# Patient Record
Sex: FEMALE | Race: BLACK OR AFRICAN AMERICAN | NOT HISPANIC OR LATINO | Employment: OTHER | URBAN - METROPOLITAN AREA
[De-identification: names, ages, dates, MRNs, and addresses within clinical notes are randomized per-mention and may not be internally consistent; named-entity substitution may affect disease eponyms.]

---

## 2018-11-20 PROBLEM — H93.8X3 SENSATION OF FULLNESS IN BOTH EARS: Status: ACTIVE | Noted: 2018-11-20

## 2018-11-20 PROBLEM — R53.83 FATIGUE: Status: ACTIVE | Noted: 2018-11-20

## 2018-11-20 PROBLEM — R42 DIZZINESS: Status: ACTIVE | Noted: 2018-11-20

## 2018-11-20 PROBLEM — I10 ESSENTIAL HYPERTENSION: Status: ACTIVE | Noted: 2018-11-20

## 2021-03-05 ENCOUNTER — IMMUNIZATION (OUTPATIENT)
Dept: INTERNAL MEDICINE | Facility: CLINIC | Age: 76
End: 2021-03-05
Payer: MEDICARE

## 2021-03-05 DIAGNOSIS — Z23 NEED FOR VACCINATION: Primary | ICD-10-CM

## 2021-03-05 PROCEDURE — 91300 COVID-19, MRNA, LNP-S, PF, 30 MCG/0.3 ML DOSE VACCINE: CPT | Mod: PBBFAC | Performed by: FAMILY MEDICINE

## 2021-03-26 ENCOUNTER — IMMUNIZATION (OUTPATIENT)
Dept: INTERNAL MEDICINE | Facility: CLINIC | Age: 76
End: 2021-03-26
Payer: MEDICARE

## 2021-03-26 DIAGNOSIS — Z23 NEED FOR VACCINATION: Primary | ICD-10-CM

## 2021-03-26 PROCEDURE — 91300 COVID-19, MRNA, LNP-S, PF, 30 MCG/0.3 ML DOSE VACCINE: CPT | Mod: PBBFAC | Performed by: FAMILY MEDICINE

## 2021-03-26 PROCEDURE — 0002A COVID-19, MRNA, LNP-S, PF, 30 MCG/0.3 ML DOSE VACCINE: CPT | Mod: PBBFAC | Performed by: FAMILY MEDICINE

## 2023-06-13 ENCOUNTER — TELEPHONE (OUTPATIENT)
Dept: NEPHROLOGY | Facility: CLINIC | Age: 78
End: 2023-06-13
Payer: MEDICARE

## 2023-06-13 DIAGNOSIS — I10 ESSENTIAL HYPERTENSION: Primary | ICD-10-CM

## 2023-06-13 NOTE — TELEPHONE ENCOUNTER
----- Message from Oralia Rios sent at 6/13/2023  3:23 PM CDT -----  Contact: pt/[phone]  Type:  Sooner Apoointment Request    Caller is requesting a sooner appointment.    Name of Caller:Pt    When is the first available appointment?\09/11/2023  Symptoms: pt  states  her  labs  are  abnormal    Would the patient rather a call back or a response via [facility]? Call   Best Call Back Number:[phone]  Additional Information:  pt  does preferred to  see  Dr. Castillo  at  the  [facility]

## 2023-06-13 NOTE — TELEPHONE ENCOUNTER
"Gave first avail appt and labs. Gave number for renal [facility]oc also to see if she can get in sooner she will cancel this.". 6/13/23/sf   "

## 2023-09-09 ENCOUNTER — LAB VISIT (OUTPATIENT)
Dept: LAB | Facility: HOSPITAL | Age: 78
End: 2023-09-09
Attending: INTERNAL MEDICINE
Payer: MEDICARE

## 2023-09-09 DIAGNOSIS — I10 ESSENTIAL HYPERTENSION: ICD-10-CM

## 2023-09-09 LAB
ANION GAP SERPL CALC-SCNC: 10 MMOL/L (ref 8–16)
BASOPHILS # BLD AUTO: 0.05 K/UL (ref 0–0.2)
BASOPHILS NFR BLD: 1 % (ref 0–1.9)
BUN SERPL-MCNC: 19 MG/DL (ref 8–23)
CALCIUM SERPL-MCNC: 9 MG/DL (ref 8.7–10.5)
CHLORIDE SERPL-SCNC: 108 MMOL/L (ref 95–110)
CO2 SERPL-SCNC: 24 MMOL/L (ref 23–29)
CREAT SERPL-MCNC: 1 MG/DL (ref 0.5–1.4)
DIFFERENTIAL METHOD: NORMAL
EOSINOPHIL # BLD AUTO: 0.1 K/UL (ref 0–0.5)
EOSINOPHIL NFR BLD: 1.6 % (ref 0–8)
ERYTHROCYTE [DISTWIDTH] IN BLOOD BY AUTOMATED COUNT: 14.5 % (ref 11.5–14.5)
EST. GFR  (NO RACE VARIABLE): 58 ML/MIN/1.73 M^2
GLUCOSE SERPL-MCNC: 84 MG/DL (ref 70–110)
HCT VFR BLD AUTO: 39 % (ref 37–48.5)
HGB BLD-MCNC: 12.7 G/DL (ref 12–16)
IMM GRANULOCYTES # BLD AUTO: 0.02 K/UL (ref 0–0.04)
IMM GRANULOCYTES NFR BLD AUTO: 0.4 % (ref 0–0.5)
LYMPHOCYTES # BLD AUTO: 1 K/UL (ref 1–4.8)
LYMPHOCYTES NFR BLD: 20.8 % (ref 18–48)
MCH RBC QN AUTO: 29.5 PG (ref 27–31)
MCHC RBC AUTO-ENTMCNC: 32.6 G/DL (ref 32–36)
MCV RBC AUTO: 91 FL (ref 82–98)
MONOCYTES # BLD AUTO: 0.5 K/UL (ref 0.3–1)
MONOCYTES NFR BLD: 9.6 % (ref 4–15)
NEUTROPHILS # BLD AUTO: 3.3 K/UL (ref 1.8–7.7)
NEUTROPHILS NFR BLD: 66.6 % (ref 38–73)
NRBC BLD-RTO: 0 /100 WBC
PLATELET # BLD AUTO: 214 K/UL (ref 150–450)
PMV BLD AUTO: 11.2 FL (ref 9.2–12.9)
POTASSIUM SERPL-SCNC: 3.7 MMOL/L (ref 3.5–5.1)
RBC # BLD AUTO: 4.31 M/UL (ref 4–5.4)
SODIUM SERPL-SCNC: 142 MMOL/L (ref 136–145)
WBC # BLD AUTO: 4.9 K/UL (ref 3.9–12.7)

## 2023-09-09 PROCEDURE — 80048 BASIC METABOLIC PNL TOTAL CA: CPT | Performed by: INTERNAL MEDICINE

## 2023-09-09 PROCEDURE — 36415 COLL VENOUS BLD VENIPUNCTURE: CPT | Performed by: INTERNAL MEDICINE

## 2023-09-09 PROCEDURE — 85025 COMPLETE CBC W/AUTO DIFF WBC: CPT | Performed by: INTERNAL MEDICINE

## 2023-09-11 ENCOUNTER — OFFICE VISIT (OUTPATIENT)
Dept: NEPHROLOGY | Facility: CLINIC | Age: 78
End: 2023-09-11
Payer: MEDICARE

## 2023-09-11 ENCOUNTER — TELEPHONE (OUTPATIENT)
Dept: NEPHROLOGY | Facility: CLINIC | Age: 78
End: 2023-09-11
Payer: MEDICARE

## 2023-09-11 VITALS
DIASTOLIC BLOOD PRESSURE: 66 MMHG | HEIGHT: 63 IN | RESPIRATION RATE: 18 BRPM | SYSTOLIC BLOOD PRESSURE: 130 MMHG | BODY MASS INDEX: 25.08 KG/M2 | WEIGHT: 141.56 LBS | HEART RATE: 82 BPM

## 2023-09-11 DIAGNOSIS — R94.4 DECREASED GFR: Primary | ICD-10-CM

## 2023-09-11 PROCEDURE — 3288F PR FALLS RISK ASSESSMENT DOCUMENTED: ICD-10-PCS | Mod: CPTII,S$GLB,, | Performed by: INTERNAL MEDICINE

## 2023-09-11 PROCEDURE — 99999 PR PBB SHADOW E&M-EST. PATIENT-LVL III: ICD-10-PCS | Mod: PBBFAC,,, | Performed by: INTERNAL MEDICINE

## 2023-09-11 PROCEDURE — 1101F PR PT FALLS ASSESS DOC 0-1 FALLS W/OUT INJ PAST YR: ICD-10-PCS | Mod: CPTII,S$GLB,, | Performed by: INTERNAL MEDICINE

## 2023-09-11 PROCEDURE — 3078F PR MOST RECENT DIASTOLIC BLOOD PRESSURE < 80 MM HG: ICD-10-PCS | Mod: CPTII,S$GLB,, | Performed by: INTERNAL MEDICINE

## 2023-09-11 PROCEDURE — 99204 OFFICE O/P NEW MOD 45 MIN: CPT | Mod: S$GLB,,, | Performed by: INTERNAL MEDICINE

## 2023-09-11 PROCEDURE — 3078F DIAST BP <80 MM HG: CPT | Mod: CPTII,S$GLB,, | Performed by: INTERNAL MEDICINE

## 2023-09-11 PROCEDURE — 1159F PR MEDICATION LIST DOCUMENTED IN MEDICAL RECORD: ICD-10-PCS | Mod: CPTII,S$GLB,, | Performed by: INTERNAL MEDICINE

## 2023-09-11 PROCEDURE — 3075F SYST BP GE 130 - 139MM HG: CPT | Mod: CPTII,S$GLB,, | Performed by: INTERNAL MEDICINE

## 2023-09-11 PROCEDURE — 1126F AMNT PAIN NOTED NONE PRSNT: CPT | Mod: CPTII,S$GLB,, | Performed by: INTERNAL MEDICINE

## 2023-09-11 PROCEDURE — 3288F FALL RISK ASSESSMENT DOCD: CPT | Mod: CPTII,S$GLB,, | Performed by: INTERNAL MEDICINE

## 2023-09-11 PROCEDURE — 99999 PR PBB SHADOW E&M-EST. PATIENT-LVL III: CPT | Mod: PBBFAC,,, | Performed by: INTERNAL MEDICINE

## 2023-09-11 PROCEDURE — 1159F MED LIST DOCD IN RCRD: CPT | Mod: CPTII,S$GLB,, | Performed by: INTERNAL MEDICINE

## 2023-09-11 PROCEDURE — 1126F PR PAIN SEVERITY QUANTIFIED, NO PAIN PRESENT: ICD-10-PCS | Mod: CPTII,S$GLB,, | Performed by: INTERNAL MEDICINE

## 2023-09-11 PROCEDURE — 1101F PT FALLS ASSESS-DOCD LE1/YR: CPT | Mod: CPTII,S$GLB,, | Performed by: INTERNAL MEDICINE

## 2023-09-11 PROCEDURE — 99204 PR OFFICE/OUTPT VISIT, NEW, LEVL IV, 45-59 MIN: ICD-10-PCS | Mod: S$GLB,,, | Performed by: INTERNAL MEDICINE

## 2023-09-11 PROCEDURE — 3075F PR MOST RECENT SYSTOLIC BLOOD PRESS GE 130-139MM HG: ICD-10-PCS | Mod: CPTII,S$GLB,, | Performed by: INTERNAL MEDICINE

## 2023-09-11 RX ORDER — NEBIVOLOL 2.5 MG/1
2.5 TABLET ORAL
COMMUNITY

## 2023-09-11 NOTE — TELEPHONE ENCOUNTER
----- Message from Saadia Kaiser sent at 9/11/2023 11:15 AM CDT -----  Contact: nicole Jimenez is calling to speak with a nurse regarding appt . Please give a call back at [phone] .

## 2023-09-11 NOTE — PROGRESS NOTES
"Heather Lewis is a 78 y.o. female for whom nephrology consult has been requested to evaluate and give opinion.   Renal clinic consult note:  Date of consult: 9/11/23  Reason for consult: reduced eGFR  Referring physician: dr. Rj Mcnamara    HPI: Thank you for referring the pt to us. H/o and chart were reviewed. Pt was seen and examined. Pt is a 77 y/o female who presents because "a lab report in her PCP office stated that she had reduced kidney function." Pt has no acute or new c/o's, no prior issues, no dehydration, no GI losses, no NSAds being taken, no abx, no recent infections, no dysuria, no cardiopulmonary sx's. No c/o's in office today. Cr is 1.0 mg/dl.      PAST MEDICAL HISTORY:  CKD stage 3, HTN (hypertension).    PAST SURGICAL HISTORY:  She  has no past surgical history on file.    SOCIAL HISTORY:  She  reports that she has never smoked. She does not have any smokeless tobacco history on file.    FAMILY MEDICAL HISTORY:  Her family history is not on file.    Review of patient's allergies indicates:   Allergen Reactions    Benzalkonium chloride     Cephalexin     Latex, natural rubber     Neosporin (neomycin-polymyx)     Penicillins            Prior to Admission medications    Medication Sig Start Date End Date Taking? Authorizing Provider   aspirin (ECOTRIN) 81 MG EC tablet Take 81 mg by mouth.   Yes Provider, Historical   fenofibrate 120 mg Tab Take 1 tablet by mouth once daily. with food. 8/23/18  Yes Provider, Historical   latanoprost 0.005 % ophthalmic solution  10/30/18  Yes Provider, Historical   meclizine (ANTIVERT) 12.5 mg tablet **NOT COVERED** TAKE 1 TABLET BY MOUTH TWICE A DAY AS NEEDED FOR DIZZINESS 10/9/18  Yes Provider, Historical   nebivoloL (BYSTOLIC) 2.5 MG Tab Take 2.5 mg by mouth.   Yes Provider, Historical   SIMBRINZA 1-0.2 % DrpS INSTILL 1 DROP INTO LEFT EYE TWICE A DAY 9/24/18  Yes Provider, Historical   VITAMIN D2 50,000 unit capsule Take 50,000 Units by mouth every 30 days. " "10/22/18  Yes Provider, Historical   lisinopril (PRINIVIL,ZESTRIL) 5 MG tablet Take 5 mg by mouth once daily. 11/1/18 9/11/23  Provider, Historical        REVIEW OF SYSTEMS:  Patient has no fever, fatigue, visual changes, chest pain, edema, cough, dyspnea, nausea, vomiting, constipation, diarrhea, arthralgias, pruritis, dizziness, weakness, depression, confusion.      PHYSICAL EXAM:   height is 5' 3" (1.6 m) and weight is 64.2 kg (141 lb 8.6 oz). Her blood pressure is 130/66 and her pulse is 82. Her respiration is 18.   Gen: WDWN female in no apparent distress  Psych: Normal mood and affect  Skin: No rashes or ulcers  Neck: No JVD  Chest: Clear with no rales, rhonchi, wheezing with normal effort  CV: Regular with no murmurs, gallops or rubs  Abd: Soft, nontender, no distension  Ext: No edema    Labs reviewed  BMP  Lab Results   Component Value Date     09/09/2023    K 3.7 09/09/2023     09/09/2023    CO2 24 09/09/2023    BUN 19 09/09/2023    CREATININE 1.0 09/09/2023    CALCIUM 9.0 09/09/2023    ANIONGAP 10 09/09/2023    EGFRNORACEVR 58.0 (A) 09/09/2023     Lab Results   Component Value Date    WBC 4.90 09/09/2023    HGB 12.7 09/09/2023    HCT 39.0 09/09/2023    MCV 91 09/09/2023     09/09/2023       IMPRESSION AND RECOMMENDATIONS: 77 y/o female with reported reduced eGFr presented for evaluation:    Renal: s Cr is within the normal range  Reduced eGFR is due to age related decline in kidney fucntion  K normal  Na normal  Ca normal  Acid base no issues  No u/s to comment on, will order.    Pt was reassured.    2. 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    Plans and recommendations:  As reviewed above  Total time spent 45 minutes including time needed to review the records, the   patient evaluation, documentation, face-to-face discussion with the patient,   more than 50% of the time was spent on " coordination of care and counseling.    Level IV visit.  RTC 1 year  Consult note was sent to the referring MD.

## 2024-07-25 ENCOUNTER — TELEPHONE (OUTPATIENT)
Dept: NEPHROLOGY | Facility: CLINIC | Age: 79
End: 2024-07-25
Payer: MEDICARE

## 2024-07-25 ENCOUNTER — TELEPHONE (OUTPATIENT)
Dept: PULMONOLOGY | Facility: CLINIC | Age: 79
End: 2024-07-25
Payer: MEDICARE

## 2024-07-25 DIAGNOSIS — R94.4 DECREASED GFR: Primary | ICD-10-CM

## 2024-07-25 NOTE — TELEPHONE ENCOUNTER
----- Message from Niko Longoria sent at 7/25/2024  2:12 PM CDT -----  Contact: Heather  Type:  Sooner Apoointment Request    Caller is requesting a sooner appointment.  Caller declined first available appointment listed below.  Caller will not accept being placed on the waitlist and is requesting a message be sent to doctor.  Name of Caller:Heather   When is the first available appointment?9/13  Symptoms:Concerned about kidneys and kidney function  Would the patient rather a call back or a response via [facility]? call  Best Call Back Number:[phone]   Additional Information:

## 2024-07-25 NOTE — TELEPHONE ENCOUNTER
----- Message from Diamone Speed sent at 7/25/2024  3:48 PM CDT -----  Regarding: self  Type: Patient Call Back       Who called: self        What is the request in detail: pt would liek a call bavck       Can the clinic reply by [facility]? Yes       Would the patient rather a call back or a response via [facility]? Call back       Best call back number:[phone]

## 2024-07-26 ENCOUNTER — LAB VISIT (OUTPATIENT)
Dept: LAB | Facility: HOSPITAL | Age: 79
End: 2024-07-26
Attending: INTERNAL MEDICINE
Payer: MEDICARE

## 2024-07-26 DIAGNOSIS — R94.4 DECREASED GFR: ICD-10-CM

## 2024-07-26 LAB
BILIRUB UR QL STRIP: NEGATIVE
CLARITY UR: CLEAR
COLOR UR: YELLOW
CREAT UR-MCNC: 153 MG/DL (ref 15–325)
GLUCOSE UR QL STRIP: NEGATIVE
HGB UR QL STRIP: NEGATIVE
KETONES UR QL STRIP: NEGATIVE
LEUKOCYTE ESTERASE UR QL STRIP: NEGATIVE
NITRITE UR QL STRIP: NEGATIVE
PH UR STRIP: 6 [PH] (ref 5–8)
PROT UR QL STRIP: NEGATIVE
PROT UR-MCNC: <7 MG/DL (ref 0–15)
PROT/CREAT UR: NORMAL MG/G{CREAT} (ref 0–0.2)
SP GR UR STRIP: 1.02 (ref 1–1.03)
URN SPEC COLLECT METH UR: NORMAL

## 2024-07-26 PROCEDURE — 82570 ASSAY OF URINE CREATININE: CPT | Performed by: INTERNAL MEDICINE

## 2024-07-26 PROCEDURE — 81003 URINALYSIS AUTO W/O SCOPE: CPT | Performed by: INTERNAL MEDICINE

## 2024-07-31 ENCOUNTER — OFFICE VISIT (OUTPATIENT)
Dept: NEPHROLOGY | Facility: CLINIC | Age: 79
End: 2024-07-31
Payer: MEDICARE

## 2024-07-31 VITALS
DIASTOLIC BLOOD PRESSURE: 62 MMHG | HEIGHT: 63 IN | SYSTOLIC BLOOD PRESSURE: 110 MMHG | BODY MASS INDEX: 24.61 KG/M2 | WEIGHT: 138.88 LBS | HEART RATE: 77 BPM

## 2024-07-31 DIAGNOSIS — I10 PRIMARY HYPERTENSION: ICD-10-CM

## 2024-07-31 DIAGNOSIS — N18.31 STAGE 3A CHRONIC KIDNEY DISEASE: Primary | ICD-10-CM

## 2024-07-31 PROCEDURE — 3074F SYST BP LT 130 MM HG: CPT | Mod: CPTII,S$GLB,, | Performed by: INTERNAL MEDICINE

## 2024-07-31 PROCEDURE — 3288F FALL RISK ASSESSMENT DOCD: CPT | Mod: CPTII,S$GLB,, | Performed by: INTERNAL MEDICINE

## 2024-07-31 PROCEDURE — 3078F DIAST BP <80 MM HG: CPT | Mod: CPTII,S$GLB,, | Performed by: INTERNAL MEDICINE

## 2024-07-31 PROCEDURE — 99215 OFFICE O/P EST HI 40 MIN: CPT | Mod: S$GLB,,, | Performed by: INTERNAL MEDICINE

## 2024-07-31 PROCEDURE — 99999 PR PBB SHADOW E&M-EST. PATIENT-LVL IV: CPT | Mod: PBBFAC,,, | Performed by: INTERNAL MEDICINE

## 2024-07-31 PROCEDURE — 1126F AMNT PAIN NOTED NONE PRSNT: CPT | Mod: CPTII,S$GLB,, | Performed by: INTERNAL MEDICINE

## 2024-07-31 PROCEDURE — 1159F MED LIST DOCD IN RCRD: CPT | Mod: CPTII,S$GLB,, | Performed by: INTERNAL MEDICINE

## 2024-07-31 PROCEDURE — 1101F PT FALLS ASSESS-DOCD LE1/YR: CPT | Mod: CPTII,S$GLB,, | Performed by: INTERNAL MEDICINE

## 2024-07-31 PROCEDURE — 1160F RVW MEDS BY RX/DR IN RCRD: CPT | Mod: CPTII,S$GLB,, | Performed by: INTERNAL MEDICINE

## 2024-07-31 RX ORDER — LOSARTAN POTASSIUM 50 MG/1
50 TABLET ORAL DAILY
Qty: 90 TABLET | Refills: 3 | Status: SHIPPED | OUTPATIENT
Start: 2024-07-31 | End: 2025-07-31

## 2024-07-31 NOTE — PROGRESS NOTES
"Subjective:       Patient ID: Heather Lewis is a 79 y.o. female.    Chief Complaint: Decreased GFR    HPI    She presents to clinic today with her daughter for routine follow-up.  Since her last office visit she has been doing well.  Her primary concern is that her blood pressure has been showing some lability over the past few weeks to months.  She brings a blood pressure diary to clinic today that shows often times her blood pressures are higher in the evenings.  Fortunately she is not having severe spikes however her systolic blood pressures often times increase into the 140s 50s.  Her laboratory studies and medications were reviewed.  All Nephrology related questions were answered to her satisfaction.    Review of Systems   Constitutional: Negative.    HENT: Negative.     Respiratory: Negative.     Cardiovascular: Negative.    Gastrointestinal: Negative.    Genitourinary: Negative.    Musculoskeletal: Negative.    Skin: Negative.        /62   Pulse 77   Ht 5' 3" (1.6 m)   Wt 63 kg (138 lb 14.2 oz)   BMI 24.60 kg/m²     Lab Results   Component Value Date    WBC 4.90 09/09/2023    HGB 12.7 09/09/2023    HCT 39.0 09/09/2023    MCV 91 09/09/2023     09/09/2023      BMP  Lab Results   Component Value Date     09/09/2023    K 3.7 09/09/2023     09/09/2023    CO2 24 09/09/2023    BUN 19 09/09/2023    CREATININE 1.0 09/09/2023    CALCIUM 9.0 09/09/2023    ANIONGAP 10 09/09/2023     CMP  Sodium   Date Value Ref Range Status   09/09/2023 142 136 - 145 mmol/L Final     Potassium   Date Value Ref Range Status   09/09/2023 3.7 3.5 - 5.1 mmol/L Final     Chloride   Date Value Ref Range Status   09/09/2023 108 95 - 110 mmol/L Final     CO2   Date Value Ref Range Status   09/09/2023 24 23 - 29 mmol/L Final     Glucose   Date Value Ref Range Status   09/09/2023 84 70 - 110 mg/dL Final     BUN   Date Value Ref Range Status   09/09/2023 19 8 - 23 mg/dL Final     Creatinine   Date Value Ref Range " Status   09/09/2023 1.0 0.5 - 1.4 mg/dL Final     Calcium   Date Value Ref Range Status   09/09/2023 9.0 8.7 - 10.5 mg/dL Final     Anion Gap   Date Value Ref Range Status   09/09/2023 10 8 - 16 mmol/L Final     Current Outpatient Medications on File Prior to Visit   Medication Sig Dispense Refill    aspirin (ECOTRIN) 81 MG EC tablet Take 81 mg by mouth.      fenofibrate 120 mg Tab Take 1 tablet by mouth once daily. with food.  5    latanoprost 0.005 % ophthalmic solution       meclizine (ANTIVERT) 12.5 mg tablet **NOT COVERED** TAKE 1 TABLET BY MOUTH TWICE A DAY AS NEEDED FOR DIZZINESS  0    nebivoloL (BYSTOLIC) 2.5 MG Tab Take 2.5 mg by mouth.      SIMBRINZA 1-0.2 % DrpS INSTILL 1 DROP INTO LEFT EYE TWICE A DAY  99    VITAMIN D2 50,000 unit capsule Take 50,000 Units by mouth every 30 days.  0     No current facility-administered medications on file prior to visit.            Objective:            Physical Exam  Constitutional:       Appearance: Normal appearance.   HENT:      Head: Normocephalic and atraumatic.   Eyes:      General: No scleral icterus.     Extraocular Movements: Extraocular movements intact.      Pupils: Pupils are equal, round, and reactive to light.   Pulmonary:      Effort: Pulmonary effort is normal.      Breath sounds: No stridor.   Musculoskeletal:      Right lower leg: No edema.      Left lower leg: No edema.   Skin:     General: Skin is warm and dry.   Neurological:      Mental Status: She is alert and oriented to person, place, and time.   Psychiatric:         Mood and Affect: Mood normal.         Behavior: Behavior normal.       Assessment:       1. Stage 3a chronic kidney disease    2. Primary hypertension        Plan:       1. Creatinine has remained relatively stable ranging between 1.0 and 1.1.  EGFR is normal for her age and does not reflect an underlying chronic kidney disease.  Urinalysis is bland without evidence of proteinuria.    2. As per HPI she is experiencing some lability  in her blood pressure which she finds concerning.  I reviewed her blood pressure diary in detail.  As per HPI it appears that her systolic blood pressures increase more often toward the evening.  She is currently on low-dose nebivolol.  She may actually be in rapid metabolizer and not getting true 24 hour coverage.    Rather than increasing her nebivolol dose we added losartan 50 mg daily.  Will plan to check a chemistry in 1 week to monitor creatinine and potassium.  We discussed that it may take a few office visits to appropriately adjust her blood pressure regimen.  She verbalized understanding.    Approximately 40 minutes was spent in face-to-face conversation and chart review.      eRagan Murray MD

## 2024-08-05 ENCOUNTER — LAB VISIT (OUTPATIENT)
Dept: LAB | Facility: HOSPITAL | Age: 79
End: 2024-08-05
Attending: INTERNAL MEDICINE
Payer: MEDICARE

## 2024-08-05 DIAGNOSIS — I10 PRIMARY HYPERTENSION: ICD-10-CM

## 2024-08-05 DIAGNOSIS — N18.31 STAGE 3A CHRONIC KIDNEY DISEASE: ICD-10-CM

## 2024-08-05 LAB
ALBUMIN SERPL BCP-MCNC: 3.6 G/DL (ref 3.5–5.2)
ANION GAP SERPL CALC-SCNC: 10 MMOL/L (ref 8–16)
BUN SERPL-MCNC: 20 MG/DL (ref 8–23)
CALCIUM SERPL-MCNC: 9.5 MG/DL (ref 8.7–10.5)
CHLORIDE SERPL-SCNC: 106 MMOL/L (ref 95–110)
CO2 SERPL-SCNC: 27 MMOL/L (ref 23–29)
CREAT SERPL-MCNC: 1 MG/DL (ref 0.5–1.4)
EST. GFR  (NO RACE VARIABLE): 57.7 ML/MIN/1.73 M^2
GLUCOSE SERPL-MCNC: 71 MG/DL (ref 70–110)
PHOSPHATE SERPL-MCNC: 3.6 MG/DL (ref 2.7–4.5)
POTASSIUM SERPL-SCNC: 4.2 MMOL/L (ref 3.5–5.1)
SODIUM SERPL-SCNC: 143 MMOL/L (ref 136–145)

## 2024-08-05 PROCEDURE — 36415 COLL VENOUS BLD VENIPUNCTURE: CPT | Performed by: INTERNAL MEDICINE

## 2024-08-05 PROCEDURE — 80069 RENAL FUNCTION PANEL: CPT | Performed by: INTERNAL MEDICINE

## 2024-08-29 ENCOUNTER — LAB VISIT (OUTPATIENT)
Dept: LAB | Facility: HOSPITAL | Age: 79
End: 2024-08-29
Attending: INTERNAL MEDICINE
Payer: MEDICARE

## 2024-08-29 DIAGNOSIS — N18.31 STAGE 3A CHRONIC KIDNEY DISEASE: ICD-10-CM

## 2024-08-29 DIAGNOSIS — I10 PRIMARY HYPERTENSION: ICD-10-CM

## 2024-08-29 LAB
ALBUMIN SERPL BCP-MCNC: 3.4 G/DL (ref 3.5–5.2)
ANION GAP SERPL CALC-SCNC: 9 MMOL/L (ref 8–16)
BUN SERPL-MCNC: 23 MG/DL (ref 8–23)
CALCIUM SERPL-MCNC: 9.4 MG/DL (ref 8.7–10.5)
CHLORIDE SERPL-SCNC: 107 MMOL/L (ref 95–110)
CO2 SERPL-SCNC: 27 MMOL/L (ref 23–29)
CREAT SERPL-MCNC: 1 MG/DL (ref 0.5–1.4)
CREAT UR-MCNC: 159 MG/DL (ref 15–325)
EST. GFR  (NO RACE VARIABLE): 57.7 ML/MIN/1.73 M^2
GLUCOSE SERPL-MCNC: 84 MG/DL (ref 70–110)
PHOSPHATE SERPL-MCNC: 4 MG/DL (ref 2.7–4.5)
POTASSIUM SERPL-SCNC: 4.1 MMOL/L (ref 3.5–5.1)
PROT UR-MCNC: <7 MG/DL (ref 0–15)
PROT/CREAT UR: NORMAL MG/G{CREAT} (ref 0–0.2)
PTH-INTACT SERPL-MCNC: 36.2 PG/ML (ref 9–77)
SODIUM SERPL-SCNC: 143 MMOL/L (ref 136–145)

## 2024-08-29 PROCEDURE — 83970 ASSAY OF PARATHORMONE: CPT | Performed by: INTERNAL MEDICINE

## 2024-08-29 PROCEDURE — 80069 RENAL FUNCTION PANEL: CPT | Performed by: INTERNAL MEDICINE

## 2024-08-29 PROCEDURE — 36415 COLL VENOUS BLD VENIPUNCTURE: CPT | Performed by: INTERNAL MEDICINE

## 2024-08-29 PROCEDURE — 82570 ASSAY OF URINE CREATININE: CPT | Performed by: INTERNAL MEDICINE

## 2024-09-05 ENCOUNTER — TELEPHONE (OUTPATIENT)
Dept: NEPHROLOGY | Facility: CLINIC | Age: 79
End: 2024-09-05

## 2024-09-05 ENCOUNTER — OFFICE VISIT (OUTPATIENT)
Dept: NEPHROLOGY | Facility: CLINIC | Age: 79
End: 2024-09-05
Payer: MEDICARE

## 2024-09-05 VITALS
SYSTOLIC BLOOD PRESSURE: 114 MMHG | DIASTOLIC BLOOD PRESSURE: 64 MMHG | WEIGHT: 138.69 LBS | RESPIRATION RATE: 18 BRPM | HEIGHT: 63 IN | BODY MASS INDEX: 24.57 KG/M2 | HEART RATE: 81 BPM

## 2024-09-05 DIAGNOSIS — N18.31 STAGE 3A CHRONIC KIDNEY DISEASE: Primary | ICD-10-CM

## 2024-09-05 DIAGNOSIS — I10 PRIMARY HYPERTENSION: ICD-10-CM

## 2024-09-05 PROCEDURE — 99999 PR PBB SHADOW E&M-EST. PATIENT-LVL III: CPT | Mod: PBBFAC,,, | Performed by: INTERNAL MEDICINE

## 2024-09-05 PROCEDURE — 3078F DIAST BP <80 MM HG: CPT | Mod: CPTII,S$GLB,, | Performed by: INTERNAL MEDICINE

## 2024-09-05 PROCEDURE — 3074F SYST BP LT 130 MM HG: CPT | Mod: CPTII,S$GLB,, | Performed by: INTERNAL MEDICINE

## 2024-09-05 PROCEDURE — 3288F FALL RISK ASSESSMENT DOCD: CPT | Mod: CPTII,S$GLB,, | Performed by: INTERNAL MEDICINE

## 2024-09-05 PROCEDURE — 99214 OFFICE O/P EST MOD 30 MIN: CPT | Mod: S$GLB,,, | Performed by: INTERNAL MEDICINE

## 2024-09-05 PROCEDURE — 1159F MED LIST DOCD IN RCRD: CPT | Mod: CPTII,S$GLB,, | Performed by: INTERNAL MEDICINE

## 2024-09-05 PROCEDURE — 1160F RVW MEDS BY RX/DR IN RCRD: CPT | Mod: CPTII,S$GLB,, | Performed by: INTERNAL MEDICINE

## 2024-09-05 PROCEDURE — 1126F AMNT PAIN NOTED NONE PRSNT: CPT | Mod: CPTII,S$GLB,, | Performed by: INTERNAL MEDICINE

## 2024-09-05 PROCEDURE — 1101F PT FALLS ASSESS-DOCD LE1/YR: CPT | Mod: CPTII,S$GLB,, | Performed by: INTERNAL MEDICINE

## 2024-09-05 RX ORDER — CHOLECALCIFEROL (VITAMIN D3) 25 MCG
1000 TABLET ORAL DAILY
COMMUNITY

## 2024-09-05 RX ORDER — TRETINOIN 0.25 MG/G
CREAM TOPICAL
COMMUNITY
Start: 2024-08-06

## 2024-09-05 RX ORDER — LOSARTAN POTASSIUM 50 MG/1
50 TABLET ORAL DAILY
Qty: 90 TABLET | Refills: 3 | Status: SHIPPED | OUTPATIENT
Start: 2024-09-05 | End: 2025-09-05

## 2024-09-05 NOTE — PROGRESS NOTES
"Subjective:       Patient ID: Heather Lewis is a 79 y.o. female.    Chief Complaint:  Hypertension, CKD 3    HPI    She presents to clinic today for routine follow-up.  Since her last office visit she has been doing well.  She reports that unfortunately she was unable to start losartan as the prescription was never filled.  I checked the computer in the order was put through but [facility] never did receive it.  We sent a new prescription today.  I asked her to contact my office if this occurs again and we will call in the prescription manually.  Her laboratory studies and medications were reviewed.  All Nephrology related questions were answered to her satisfaction.    Review of Systems   Constitutional: Negative.    HENT: Negative.     Respiratory: Negative.     Cardiovascular: Negative.    Gastrointestinal: Negative.    Genitourinary: Negative.    Musculoskeletal: Negative.    Skin: Negative.        /64   Pulse 81   Resp 18   Ht 5' 3" (1.6 m)   Wt 62.9 kg (138 lb 10.7 oz)   BMI 24.56 kg/m²     Lab Results   Component Value Date    WBC 4.90 09/09/2023    HGB 12.7 09/09/2023    HCT 39.0 09/09/2023    MCV 91 09/09/2023     09/09/2023      BMP  Lab Results   Component Value Date     08/29/2024    K 4.1 08/29/2024     08/29/2024    CO2 27 08/29/2024    BUN 23 08/29/2024    CREATININE 1.0 08/29/2024    CALCIUM 9.4 08/29/2024    ANIONGAP 9 08/29/2024     CMP  Sodium   Date Value Ref Range Status   08/29/2024 143 136 - 145 mmol/L Final     Potassium   Date Value Ref Range Status   08/29/2024 4.1 3.5 - 5.1 mmol/L Final     Chloride   Date Value Ref Range Status   08/29/2024 107 95 - 110 mmol/L Final     CO2   Date Value Ref Range Status   08/29/2024 27 23 - 29 mmol/L Final     Glucose   Date Value Ref Range Status   08/29/2024 84 70 - 110 mg/dL Final     BUN   Date Value Ref Range Status   08/29/2024 23 8 - 23 mg/dL Final     Creatinine   Date Value Ref Range Status   08/29/2024 1.0 0.5 - 1.4 " mg/dL Final     Calcium   Date Value Ref Range Status   08/29/2024 9.4 8.7 - 10.5 mg/dL Final     Albumin   Date Value Ref Range Status   08/29/2024 3.4 (L) 3.5 - 5.2 g/dL Final     Anion Gap   Date Value Ref Range Status   08/29/2024 9 8 - 16 mmol/L Final     Current Outpatient Medications on File Prior to Visit   Medication Sig Dispense Refill    aspirin (ECOTRIN) 81 MG EC tablet Take 81 mg by mouth.      latanoprost 0.005 % ophthalmic solution       meclizine (ANTIVERT) 12.5 mg tablet **NOT COVERED** TAKE 1 TABLET BY MOUTH TWICE A DAY AS NEEDED FOR DIZZINESS  0    nebivoloL (BYSTOLIC) 2.5 MG Tab Take 2.5 mg by mouth.      SIMBRINZA 1-0.2 % DrpS INSTILL 1 DROP INTO LEFT EYE TWICE A DAY  99    tretinoin (RETIN-A) 0.025 % cream PLEASE SEE ATTACHED FOR DETAILED DIRECTIONS      vitamin D (VITAMIN D3) 1000 units Tab Take 1,000 Units by mouth once daily.      VITAMIN D2 50,000 unit capsule Take 50,000 Units by mouth every 30 days.  0    fenofibrate 120 mg Tab Take 1 tablet by mouth once daily. with food. (Patient not taking: Reported on 9/5/2024)  5    [DISCONTINUED] losartan (COZAAR) 50 MG tablet Take 1 tablet (50 mg total) by mouth once daily. (Patient not taking: Reported on 9/5/2024) 90 tablet 3    [DISCONTINUED] losartan (COZAAR) 50 MG tablet Take 1 tablet (50 mg total) by mouth once daily. (Patient not taking: Reported on 9/5/2024) 90 tablet 3     No current facility-administered medications on file prior to visit.            Objective:            Physical Exam  Constitutional:       Appearance: Normal appearance.   HENT:      Head: Normocephalic and atraumatic.   Eyes:      General: No scleral icterus.     Extraocular Movements: Extraocular movements intact.      Pupils: Pupils are equal, round, and reactive to light.   Pulmonary:      Effort: Pulmonary effort is normal.      Breath sounds: No stridor.   Musculoskeletal:      Right lower leg: No edema.      Left lower leg: No edema.   Skin:     General: Skin is  warm and dry.   Neurological:      General: No focal deficit present.      Mental Status: She is alert and oriented to person, place, and time.   Psychiatric:         Mood and Affect: Mood normal.         Behavior: Behavior normal.       Assessment:       1. Stage 3a chronic kidney disease    2. Primary hypertension        Plan:       1. Creatinine has remained stable at 1.0 for the past year.  Urinalysis is bland without evidence of proteinuria.  Will start low-dose RAAS inhibition.    2. Her blood pressures are still somewhat labile.  She reports that in the mornings her systolic is around 120 but by evening it will increase to 170-180.  She was unfortunately unable to start losartan.  I sent in a new prescription for 50 mg daily.  Will continue to monitor.        Reagan Murray MD

## 2024-09-05 NOTE — TELEPHONE ENCOUNTER
----- Message from Divya Parsons sent at 9/5/2024  9:36 AM CDT -----  Contact: Self [phone]  Would like to receive medical advice.    Would they like a call back or a response via [facility]:  call back    Additional information:  Calling to speak with the nurse regarding upcoming appt on 11/20.

## 2024-09-13 ENCOUNTER — TELEPHONE (OUTPATIENT)
Dept: NEPHROLOGY | Facility: CLINIC | Age: 79
End: 2024-09-13
Payer: MEDICARE

## 2024-09-13 DIAGNOSIS — N18.31 STAGE 3A CHRONIC KIDNEY DISEASE: ICD-10-CM

## 2024-09-13 DIAGNOSIS — I10 PRIMARY HYPERTENSION: ICD-10-CM

## 2024-09-13 RX ORDER — LOSARTAN POTASSIUM 50 MG/1
50 TABLET ORAL DAILY
Qty: 90 TABLET | Refills: 3 | Status: SHIPPED | OUTPATIENT
Start: 2024-09-13 | End: 2025-09-13

## 2024-09-13 NOTE — TELEPHONE ENCOUNTER
----- Message from Em Hernandez sent at 9/13/2024 12:30 PM CDT -----  Contact: self      Who Called: .Heather Lewis  RX Name and Strength:losartan (COZAAR) 50 MG tablet  Preferred Pharmacy with phone number:.  [id number] [place] - [address]  [address] [place] [address]  Phone: [phone] Fax: [phone]  Local or Mail Order:local   Ordering Provider:Terri  Would the patient rather a call back or a response via [facility]? Call back   Best Call Back Number:.[phone]   Additional Information: pt states pharmacy does not have medication

## 2024-09-13 NOTE — TELEPHONE ENCOUNTER
----- Message from Em Hernandez sent at 9/13/2024 12:30 PM CDT -----  Contact: self      Who Called: .Heather Lewis  RX Name and Strength:losartan (COZAAR) 50 MG tablet  Preferred Pharmacy with phone number:.  [id number] [place] - [address]  [address] [place] [address]  Phone: [phone] Fax: [phone]  Local or Mail Order:local   Ordering Provider:Terri  Would the patient rather a call back or a response via [facility]? Call back   Best Call Back Number:.[phone]   Additional Information: pt states pharmacy does not have medication

## 2024-09-20 DIAGNOSIS — N18.31 STAGE 3A CHRONIC KIDNEY DISEASE: ICD-10-CM

## 2024-09-20 DIAGNOSIS — I10 PRIMARY HYPERTENSION: ICD-10-CM

## 2024-09-20 RX ORDER — LOSARTAN POTASSIUM 50 MG/1
50 TABLET ORAL DAILY
Qty: 90 TABLET | Refills: 0 | Status: SHIPPED | OUTPATIENT
Start: 2024-09-20 | End: 2025-09-20

## 2024-09-20 NOTE — TELEPHONE ENCOUNTER
----- Message from Janelle Epps sent at 9/20/2024  1:43 PM CDT -----  Contact: Heather   Heather would like a call back at [phone] in regards to her pharmacy not receiving the prescription for,losartan (COZAAR) 50 MG tablet. Patient called a week ago and hasn't gotten a response.   [id number] [place] - [address]  [address] [place] [address]  Phone: [phone] Fax: [phone]        Thanks   Am

## 2024-11-05 ENCOUNTER — LAB VISIT (OUTPATIENT)
Dept: LAB | Facility: HOSPITAL | Age: 79
End: 2024-11-05
Attending: INTERNAL MEDICINE
Payer: MEDICARE

## 2024-11-05 DIAGNOSIS — I10 PRIMARY HYPERTENSION: ICD-10-CM

## 2024-11-05 DIAGNOSIS — N18.31 STAGE 3A CHRONIC KIDNEY DISEASE: ICD-10-CM

## 2024-11-05 LAB
ALBUMIN SERPL BCP-MCNC: 3.4 G/DL (ref 3.5–5.2)
ANION GAP SERPL CALC-SCNC: 9 MMOL/L (ref 8–16)
BUN SERPL-MCNC: 25 MG/DL (ref 8–23)
CALCIUM SERPL-MCNC: 9.2 MG/DL (ref 8.7–10.5)
CHLORIDE SERPL-SCNC: 107 MMOL/L (ref 95–110)
CO2 SERPL-SCNC: 27 MMOL/L (ref 23–29)
CREAT SERPL-MCNC: 1 MG/DL (ref 0.5–1.4)
EST. GFR  (NO RACE VARIABLE): 57.7 ML/MIN/1.73 M^2
GLUCOSE SERPL-MCNC: 75 MG/DL (ref 70–110)
PHOSPHATE SERPL-MCNC: 4 MG/DL (ref 2.7–4.5)
POTASSIUM SERPL-SCNC: 4.1 MMOL/L (ref 3.5–5.1)
SODIUM SERPL-SCNC: 143 MMOL/L (ref 136–145)

## 2024-11-05 PROCEDURE — 80069 RENAL FUNCTION PANEL: CPT | Performed by: INTERNAL MEDICINE

## 2024-11-05 PROCEDURE — 36415 COLL VENOUS BLD VENIPUNCTURE: CPT | Performed by: INTERNAL MEDICINE

## 2024-11-12 ENCOUNTER — OFFICE VISIT (OUTPATIENT)
Dept: NEPHROLOGY | Facility: CLINIC | Age: 79
End: 2024-11-12
Payer: MEDICARE

## 2024-11-12 VITALS
WEIGHT: 138.88 LBS | BODY MASS INDEX: 24.61 KG/M2 | HEIGHT: 63 IN | SYSTOLIC BLOOD PRESSURE: 94 MMHG | DIASTOLIC BLOOD PRESSURE: 54 MMHG | HEART RATE: 70 BPM | RESPIRATION RATE: 18 BRPM

## 2024-11-12 DIAGNOSIS — I10 PRIMARY HYPERTENSION: ICD-10-CM

## 2024-11-12 DIAGNOSIS — N18.31 STAGE 3A CHRONIC KIDNEY DISEASE: Primary | ICD-10-CM

## 2024-11-12 PROCEDURE — 1160F RVW MEDS BY RX/DR IN RCRD: CPT | Mod: CPTII,S$GLB,, | Performed by: INTERNAL MEDICINE

## 2024-11-12 PROCEDURE — 99999 PR PBB SHADOW E&M-EST. PATIENT-LVL III: CPT | Mod: PBBFAC,,, | Performed by: INTERNAL MEDICINE

## 2024-11-12 PROCEDURE — 3288F FALL RISK ASSESSMENT DOCD: CPT | Mod: CPTII,S$GLB,, | Performed by: INTERNAL MEDICINE

## 2024-11-12 PROCEDURE — 3078F DIAST BP <80 MM HG: CPT | Mod: CPTII,S$GLB,, | Performed by: INTERNAL MEDICINE

## 2024-11-12 PROCEDURE — 99215 OFFICE O/P EST HI 40 MIN: CPT | Mod: S$GLB,,, | Performed by: INTERNAL MEDICINE

## 2024-11-12 PROCEDURE — 3074F SYST BP LT 130 MM HG: CPT | Mod: CPTII,S$GLB,, | Performed by: INTERNAL MEDICINE

## 2024-11-12 PROCEDURE — 1101F PT FALLS ASSESS-DOCD LE1/YR: CPT | Mod: CPTII,S$GLB,, | Performed by: INTERNAL MEDICINE

## 2024-11-12 PROCEDURE — 1126F AMNT PAIN NOTED NONE PRSNT: CPT | Mod: CPTII,S$GLB,, | Performed by: INTERNAL MEDICINE

## 2024-11-12 PROCEDURE — 1159F MED LIST DOCD IN RCRD: CPT | Mod: CPTII,S$GLB,, | Performed by: INTERNAL MEDICINE

## 2024-11-12 RX ORDER — EZETIMIBE 10 MG/1
10 TABLET ORAL DAILY
COMMUNITY

## 2024-11-12 NOTE — PROGRESS NOTES
"Subjective:       Patient ID: Heather Lewis is a 79 y.o. female.    Chief Complaint:  Hypertension, CKD    HPI    Since her last office visit her blood pressure medicines were changed by her primary care provider.  She was increased to Bystolic 2.5 mg twice daily and continued on losartan 50 mg daily.  She is now having low blood pressures in the morning.  In the clinic today her blood pressure had a systolic of 94.  She did not relate symptoms of dizziness her being unsteady on her feet however she does state that she does not feel quite normal.  Her medications and laboratory studies were reviewed.  All Nephrology related questions were answered to her satisfaction.    Review of Systems   Constitutional: Negative.    HENT: Negative.     Respiratory: Negative.     Cardiovascular: Negative.    Gastrointestinal: Negative.    Genitourinary: Negative.    Musculoskeletal: Negative.    Skin: Negative.        BP (!) 94/54   Pulse 70   Resp 18   Ht 5' 3" (1.6 m)   Wt 63 kg (138 lb 14.2 oz)   BMI 24.60 kg/m²     Lab Results   Component Value Date    WBC 4.90 09/09/2023    HGB 12.7 09/09/2023    HCT 39.0 09/09/2023    MCV 91 09/09/2023     09/09/2023      BMP  Lab Results   Component Value Date     11/05/2024    K 4.1 11/05/2024     11/05/2024    CO2 27 11/05/2024    BUN 25 (H) 11/05/2024    CREATININE 1.0 11/05/2024    CALCIUM 9.2 11/05/2024    ANIONGAP 9 11/05/2024     CMP  Sodium   Date Value Ref Range Status   11/05/2024 143 136 - 145 mmol/L Final     Potassium   Date Value Ref Range Status   11/05/2024 4.1 3.5 - 5.1 mmol/L Final     Chloride   Date Value Ref Range Status   11/05/2024 107 95 - 110 mmol/L Final     CO2   Date Value Ref Range Status   11/05/2024 27 23 - 29 mmol/L Final     Glucose   Date Value Ref Range Status   11/05/2024 75 70 - 110 mg/dL Final     BUN   Date Value Ref Range Status   11/05/2024 25 (H) 8 - 23 mg/dL Final     Creatinine   Date Value Ref Range Status   11/05/2024 " 1.0 0.5 - 1.4 mg/dL Final     Calcium   Date Value Ref Range Status   11/05/2024 9.2 8.7 - 10.5 mg/dL Final     Albumin   Date Value Ref Range Status   11/05/2024 3.4 (L) 3.5 - 5.2 g/dL Final     Anion Gap   Date Value Ref Range Status   11/05/2024 9 8 - 16 mmol/L Final     Current Outpatient Medications on File Prior to Visit   Medication Sig Dispense Refill    aspirin (ECOTRIN) 81 MG EC tablet Take 81 mg by mouth.      ezetimibe (ZETIA) 10 mg tablet Take 10 mg by mouth once daily.      latanoprost 0.005 % ophthalmic solution       losartan (COZAAR) 50 MG tablet Take 1 tablet (50 mg total) by mouth once daily. 90 tablet 0    meclizine (ANTIVERT) 12.5 mg tablet **NOT COVERED** TAKE 1 TABLET BY MOUTH TWICE A DAY AS NEEDED FOR DIZZINESS  0    nebivoloL (BYSTOLIC) 2.5 MG Tab Take 5 mg by mouth 2 (two) times daily.      SIMBRINZA 1-0.2 % DrpS INSTILL 1 DROP INTO LEFT EYE TWICE A DAY  99    tretinoin (RETIN-A) 0.025 % cream PLEASE SEE ATTACHED FOR DETAILED DIRECTIONS      vitamin D (VITAMIN D3) 1000 units Tab Take 1,000 Units by mouth once daily.      VITAMIN D2 50,000 unit capsule Take 50,000 Units by mouth every 30 days.  0    fenofibrate 120 mg Tab Take 1 tablet by mouth once daily. with food. (Patient not taking: Reported on 11/12/2024)  5     No current facility-administered medications on file prior to visit.            Objective:            Physical Exam  Constitutional:       Appearance: Normal appearance.   HENT:      Head: Normocephalic and atraumatic.   Eyes:      General: No scleral icterus.     Extraocular Movements: Extraocular movements intact.      Pupils: Pupils are equal, round, and reactive to light.   Pulmonary:      Effort: Pulmonary effort is normal.      Breath sounds: No stridor.   Musculoskeletal:      Right lower leg: No edema.      Left lower leg: No edema.   Skin:     General: Skin is warm and dry.   Neurological:      General: No focal deficit present.      Mental Status: She is alert and  oriented to person, place, and time.   Psychiatric:         Mood and Affect: Mood normal.         Behavior: Behavior normal.       Assessment:       1. Stage 3a chronic kidney disease    2. Primary hypertension        Plan:       1. Serum creatinine has remained stable at 1.0 for the past year.  Urinalysis is bland without evidence of proteinuria.  She is euvolemic on exam.  Potassium is stable at 4.1.    2. Her blood pressure is still show some volatility.  She brings a blood pressure diary in today that shows for the most part her systolic blood pressures running between about 105 and 125.  In the evening she will have occasionally blood pressures with systolics in the 140s to 160s.  Since her Bystolic dose was increased to twice daily her blood pressures are now running even lower in the mornings with systolics in the 90s.    We spent a long time discussing her blood pressure readings.  We discussed that overall we need to be looking at her averages and not periodic spikes in blood pressure.  If we are to anna the periodic asymptomatic spikes the risk is over treating her blood pressure and causing her to become symptomatically low.  As per HPI her systolic in the clinic today was in the mid 90s.  She did not have symptoms orthostasis but overall status she did not feel quite right.    I have asked her to discontinue her morning dose of Bystolic can continue her evening dose of 2.5.  She will continue losartan in the morning at 50 mg daily.  Hopefully we will not see significant low blood pressures in the morning and we will be able to blood some of the spikes in the evening.  Will continue to monitor.    Approximately 40 minutes was spent in face-to-face conversation and chart review.    Reagan Murray MD

## 2024-12-23 ENCOUNTER — LAB VISIT (OUTPATIENT)
Dept: LAB | Facility: HOSPITAL | Age: 79
End: 2024-12-23
Attending: INTERNAL MEDICINE
Payer: MEDICARE

## 2024-12-23 DIAGNOSIS — N18.31 STAGE 3A CHRONIC KIDNEY DISEASE: ICD-10-CM

## 2024-12-23 DIAGNOSIS — I10 PRIMARY HYPERTENSION: ICD-10-CM

## 2024-12-23 LAB
ALBUMIN SERPL BCP-MCNC: 3.2 G/DL (ref 3.5–5.2)
ANION GAP SERPL CALC-SCNC: 9 MMOL/L (ref 8–16)
BUN SERPL-MCNC: 19 MG/DL (ref 8–23)
CALCIUM SERPL-MCNC: 8.7 MG/DL (ref 8.7–10.5)
CHLORIDE SERPL-SCNC: 109 MMOL/L (ref 95–110)
CO2 SERPL-SCNC: 25 MMOL/L (ref 23–29)
CREAT SERPL-MCNC: 0.9 MG/DL (ref 0.5–1.4)
EST. GFR  (NO RACE VARIABLE): >60 ML/MIN/1.73 M^2
GLUCOSE SERPL-MCNC: 77 MG/DL (ref 70–110)
PHOSPHATE SERPL-MCNC: 4.3 MG/DL (ref 2.7–4.5)
POTASSIUM SERPL-SCNC: 3.9 MMOL/L (ref 3.5–5.1)
PTH-INTACT SERPL-MCNC: 46.8 PG/ML (ref 9–77)
SODIUM SERPL-SCNC: 143 MMOL/L (ref 136–145)

## 2024-12-23 PROCEDURE — 36415 COLL VENOUS BLD VENIPUNCTURE: CPT | Performed by: INTERNAL MEDICINE

## 2024-12-23 PROCEDURE — 80069 RENAL FUNCTION PANEL: CPT | Performed by: INTERNAL MEDICINE

## 2024-12-23 PROCEDURE — 83970 ASSAY OF PARATHORMONE: CPT | Performed by: INTERNAL MEDICINE

## 2024-12-30 ENCOUNTER — OFFICE VISIT (OUTPATIENT)
Dept: NEPHROLOGY | Facility: CLINIC | Age: 79
End: 2024-12-30
Payer: MEDICARE

## 2024-12-30 VITALS
WEIGHT: 140.19 LBS | RESPIRATION RATE: 18 BRPM | DIASTOLIC BLOOD PRESSURE: 60 MMHG | SYSTOLIC BLOOD PRESSURE: 104 MMHG | HEIGHT: 63 IN | BODY MASS INDEX: 24.84 KG/M2 | HEART RATE: 66 BPM

## 2024-12-30 DIAGNOSIS — N18.31 STAGE 3A CHRONIC KIDNEY DISEASE: Primary | ICD-10-CM

## 2024-12-30 DIAGNOSIS — I10 PRIMARY HYPERTENSION: ICD-10-CM

## 2024-12-30 PROCEDURE — 99215 OFFICE O/P EST HI 40 MIN: CPT | Mod: S$GLB,,, | Performed by: INTERNAL MEDICINE

## 2024-12-30 PROCEDURE — 1159F MED LIST DOCD IN RCRD: CPT | Mod: CPTII,S$GLB,, | Performed by: INTERNAL MEDICINE

## 2024-12-30 PROCEDURE — 1126F AMNT PAIN NOTED NONE PRSNT: CPT | Mod: CPTII,S$GLB,, | Performed by: INTERNAL MEDICINE

## 2024-12-30 PROCEDURE — 3078F DIAST BP <80 MM HG: CPT | Mod: CPTII,S$GLB,, | Performed by: INTERNAL MEDICINE

## 2024-12-30 PROCEDURE — 3074F SYST BP LT 130 MM HG: CPT | Mod: CPTII,S$GLB,, | Performed by: INTERNAL MEDICINE

## 2024-12-30 PROCEDURE — 1101F PT FALLS ASSESS-DOCD LE1/YR: CPT | Mod: CPTII,S$GLB,, | Performed by: INTERNAL MEDICINE

## 2024-12-30 PROCEDURE — 1160F RVW MEDS BY RX/DR IN RCRD: CPT | Mod: CPTII,S$GLB,, | Performed by: INTERNAL MEDICINE

## 2024-12-30 PROCEDURE — 3288F FALL RISK ASSESSMENT DOCD: CPT | Mod: CPTII,S$GLB,, | Performed by: INTERNAL MEDICINE

## 2024-12-30 PROCEDURE — 99999 PR PBB SHADOW E&M-EST. PATIENT-LVL III: CPT | Mod: PBBFAC,,, | Performed by: INTERNAL MEDICINE

## 2024-12-30 NOTE — PROGRESS NOTES
"Subjective:       Patient ID: Hetaher Lewis is a 79 y.o. female.    Chief Complaint:  Hypertension    HPI    She presents to clinic today for routine follow-up.  Since her last office visit she has been doing well.  She is still seeing some fluctuation in blood pressures at home.  There is also a fairly wide disparity between her blood pressure readings at home and what we are getting here in the clinic.  Her laboratory studies and medications were reviewed.  All Nephrology related questions were answered to her satisfaction.    Review of Systems   Constitutional: Negative.    HENT: Negative.     Respiratory: Negative.     Cardiovascular: Negative.    Gastrointestinal: Negative.    Genitourinary: Negative.    Musculoskeletal: Negative.    Skin: Negative.        /60   Pulse 66   Resp 18   Ht 5' 3" (1.6 m)   Wt 63.6 kg (140 lb 3.4 oz)   BMI 24.84 kg/m²     Lab Results   Component Value Date    WBC 4.90 09/09/2023    HGB 12.7 09/09/2023    HCT 39.0 09/09/2023    MCV 91 09/09/2023     09/09/2023      BMP  Lab Results   Component Value Date     12/23/2024    K 3.9 12/23/2024     12/23/2024    CO2 25 12/23/2024    BUN 19 12/23/2024    CREATININE 0.9 12/23/2024    CALCIUM 8.7 12/23/2024    ANIONGAP 9 12/23/2024     CMP  Sodium   Date Value Ref Range Status   12/23/2024 143 136 - 145 mmol/L Final     Potassium   Date Value Ref Range Status   12/23/2024 3.9 3.5 - 5.1 mmol/L Final     Chloride   Date Value Ref Range Status   12/23/2024 109 95 - 110 mmol/L Final     CO2   Date Value Ref Range Status   12/23/2024 25 23 - 29 mmol/L Final     Glucose   Date Value Ref Range Status   12/23/2024 77 70 - 110 mg/dL Final     BUN   Date Value Ref Range Status   12/23/2024 19 8 - 23 mg/dL Final     Creatinine   Date Value Ref Range Status   12/23/2024 0.9 0.5 - 1.4 mg/dL Final     Calcium   Date Value Ref Range Status   12/23/2024 8.7 8.7 - 10.5 mg/dL Final     Albumin   Date Value Ref Range Status "   12/23/2024 3.2 (L) 3.5 - 5.2 g/dL Final     Anion Gap   Date Value Ref Range Status   12/23/2024 9 8 - 16 mmol/L Final     Current Outpatient Medications on File Prior to Visit   Medication Sig Dispense Refill    aspirin (ECOTRIN) 81 MG EC tablet Take 81 mg by mouth.      ezetimibe (ZETIA) 10 mg tablet Take 10 mg by mouth once daily.      latanoprost 0.005 % ophthalmic solution       losartan (COZAAR) 50 MG tablet Take 1 tablet (50 mg total) by mouth once daily. 90 tablet 0    meclizine (ANTIVERT) 12.5 mg tablet **NOT COVERED** TAKE 1 TABLET BY MOUTH TWICE A DAY AS NEEDED FOR DIZZINESS  0    nebivoloL (BYSTOLIC) 2.5 MG Tab Take 5 mg by mouth 2 (two) times daily.      SIMBRINZA 1-0.2 % DrpS INSTILL 1 DROP INTO LEFT EYE TWICE A DAY  99    tretinoin (RETIN-A) 0.025 % cream PLEASE SEE ATTACHED FOR DETAILED DIRECTIONS      vitamin D (VITAMIN D3) 1000 units Tab Take 1,000 Units by mouth once daily.      VITAMIN D2 50,000 unit capsule Take 50,000 Units by mouth every 30 days.  0    fenofibrate 120 mg Tab Take 1 tablet by mouth once daily. with food. (Patient not taking: Reported on 12/30/2024)  5     No current facility-administered medications on file prior to visit.            Objective:            Physical Exam  Constitutional:       Appearance: Normal appearance.   HENT:      Head: Normocephalic and atraumatic.   Eyes:      General: No scleral icterus.     Extraocular Movements: Extraocular movements intact.      Pupils: Pupils are equal, round, and reactive to light.   Pulmonary:      Effort: Pulmonary effort is normal.      Breath sounds: No stridor.   Musculoskeletal:      Right lower leg: No edema.      Left lower leg: No edema.   Skin:     General: Skin is warm and dry.   Neurological:      General: No focal deficit present.      Mental Status: She is alert and oriented to person, place, and time.   Psychiatric:         Mood and Affect: Mood normal.         Behavior: Behavior normal.       Assessment:       1.  Stage 3a chronic kidney disease    2. Primary hypertension        Plan:       1. She has late stage II early stage IIIA CKD.  Creatinine is normal at 0.9.  She ranges between 0.9 and 1.0.  Urinalysis is bland without evidence of proteinuria.  She is on a RAAS inhibitor.    2. She continues to relate quite a lot of fluctuation in her blood pressure at home.  She is taking 2.5 mg of Bystolic in the morning and 50 mg of losartan mid day.    She relates that her blood pressure this morning at home was 141 systolic.  In the clinic her blood pressure was 104.  Last office visit her systolic was in the low 90s.  I am concerned that her home blood pressure cuff is not reading accurately.  I asked her to bring it to clinic on her next office visit so that it can be calibrated to our wall sphygmomanometer.    Approximately 40 minutes was spent in face-to-face conversation and chart review.      Reagan Murray MD

## 2025-01-29 DIAGNOSIS — I10 PRIMARY HYPERTENSION: ICD-10-CM

## 2025-01-29 DIAGNOSIS — N18.31 STAGE 3A CHRONIC KIDNEY DISEASE: ICD-10-CM

## 2025-01-29 RX ORDER — LOSARTAN POTASSIUM 50 MG/1
50 TABLET ORAL DAILY
Qty: 90 TABLET | Refills: 0 | Status: SHIPPED | OUTPATIENT
Start: 2025-01-29 | End: 2026-01-29

## 2025-01-29 NOTE — TELEPHONE ENCOUNTER
----- Message from Haylee sent at 1/29/2025 10:59 AM CST -----  Contact: Patient, [phone]  Requesting an RX refill or new RX.    Is this a refill or new RX: Refill    RX name and strength (copy/paste from chart):  losartan (COZAAR) 50 MG tablet    Is this a 30 day or 90 day RX: 90    Pharmacy name and phone # (copy/paste from chart):    [facility]/pharmacy #[id number] - [place] - [address].  [address]  [address]  [place] [address]  Phone: [phone] Fax: [phone]       The doctors have asked that we provide their patients with the following 2 reminders -- prescription refills can take up to 72 hours, and a friendly reminder that in the future you can use your [facility] account to request refills: Yes

## 2025-02-21 ENCOUNTER — LAB VISIT (OUTPATIENT)
Dept: LAB | Facility: HOSPITAL | Age: 80
End: 2025-02-21
Attending: INTERNAL MEDICINE
Payer: MEDICARE

## 2025-02-21 DIAGNOSIS — I10 PRIMARY HYPERTENSION: ICD-10-CM

## 2025-02-21 DIAGNOSIS — N18.31 STAGE 3A CHRONIC KIDNEY DISEASE: ICD-10-CM

## 2025-02-21 LAB
ALBUMIN SERPL BCP-MCNC: 3.2 G/DL (ref 3.5–5.2)
ANION GAP SERPL CALC-SCNC: 8 MMOL/L (ref 8–16)
BUN SERPL-MCNC: 23 MG/DL (ref 8–23)
CALCIUM SERPL-MCNC: 9.3 MG/DL (ref 8.7–10.5)
CHLORIDE SERPL-SCNC: 106 MMOL/L (ref 95–110)
CO2 SERPL-SCNC: 27 MMOL/L (ref 23–29)
CREAT SERPL-MCNC: 0.9 MG/DL (ref 0.5–1.4)
EST. GFR  (NO RACE VARIABLE): >60 ML/MIN/1.73 M^2
GLUCOSE SERPL-MCNC: 88 MG/DL (ref 70–110)
PHOSPHATE SERPL-MCNC: 4 MG/DL (ref 2.7–4.5)
POTASSIUM SERPL-SCNC: 3.9 MMOL/L (ref 3.5–5.1)
SODIUM SERPL-SCNC: 141 MMOL/L (ref 136–145)

## 2025-02-21 PROCEDURE — 36415 COLL VENOUS BLD VENIPUNCTURE: CPT | Performed by: INTERNAL MEDICINE

## 2025-02-21 PROCEDURE — 80069 RENAL FUNCTION PANEL: CPT | Performed by: INTERNAL MEDICINE

## 2025-02-28 ENCOUNTER — HOSPITAL ENCOUNTER (OUTPATIENT)
Dept: RADIOLOGY | Facility: HOSPITAL | Age: 80
Discharge: HOME | End: 2025-02-28
Attending: INTERNAL MEDICINE
Payer: MEDICARE

## 2025-02-28 ENCOUNTER — OFFICE VISIT (OUTPATIENT)
Dept: NEPHROLOGY | Facility: CLINIC | Age: 80
End: 2025-02-28
Payer: MEDICARE

## 2025-02-28 VITALS
RESPIRATION RATE: 18 BRPM | WEIGHT: 138.44 LBS | HEIGHT: 63 IN | BODY MASS INDEX: 24.53 KG/M2 | HEART RATE: 82 BPM | DIASTOLIC BLOOD PRESSURE: 58 MMHG | SYSTOLIC BLOOD PRESSURE: 82 MMHG

## 2025-02-28 DIAGNOSIS — N18.31 STAGE 3A CHRONIC KIDNEY DISEASE: Primary | ICD-10-CM

## 2025-02-28 DIAGNOSIS — I10 PRIMARY HYPERTENSION: ICD-10-CM

## 2025-02-28 PROCEDURE — 99999 PR PBB SHADOW E&M-EST. PATIENT-LVL IV: CPT | Mod: PBBFAC,,, | Performed by: INTERNAL MEDICINE

## 2025-02-28 RX ORDER — CLONIDINE HYDROCHLORIDE 0.1 MG/1
TABLET ORAL
COMMUNITY
Start: 2025-01-30

## 2025-02-28 RX ORDER — AMLODIPINE BESYLATE 2.5 MG/1
2.5 TABLET ORAL 2 TIMES DAILY
COMMUNITY
Start: 2025-02-06

## 2025-02-28 NOTE — PROGRESS NOTES
"Subjective:       Patient ID: Heather Lewis is a 79 y.o. female.    Chief Complaint:  CKD, hypertension    HPI    Since her last office visit she had an ER admission for accelerated hypertension.  She was at [facility] for hypertension.  It appears that her systolic blood pressure went up to as high as 200.  In discussion today she reports that for at least about 6 months she has had quite a bit of fluctuation in her blood pressure.  She is not taking any over-the-counter medications particularly NSAIDs or decongestants.  She has had a couple of changes in her blood pressure regimen, addition of medications, no other significant changes.  Her laboratory studies medications were reviewed.  She has not noted any palpitations, rapid heartbeat or flushing.  All Nephrology related questions were answered to her satisfaction.    Review of Systems   Constitutional: Negative.    HENT: Negative.     Respiratory: Negative.     Cardiovascular: Negative.    Gastrointestinal: Negative.    Genitourinary: Negative.    Musculoskeletal: Negative.    Skin: Negative.        BP (!) 82/58   Pulse 82   Resp 18   Ht 5' 3" (1.6 m)   Wt 62.8 kg (138 lb 7.2 oz)   BMI 24.53 kg/m²     Lab Results   Component Value Date    WBC 4.90 09/09/2023    HGB 12.7 09/09/2023    HCT 39.0 09/09/2023    MCV 91 09/09/2023     09/09/2023      BMP  Lab Results   Component Value Date     02/21/2025    K 3.9 02/21/2025     02/21/2025    CO2 27 02/21/2025    BUN 23 02/21/2025    CREATININE 0.9 02/21/2025    CALCIUM 9.3 02/21/2025    ANIONGAP 8 02/21/2025     CMP  Sodium   Date Value Ref Range Status   02/21/2025 141 136 - 145 mmol/L Final     Potassium   Date Value Ref Range Status   02/21/2025 3.9 3.5 - 5.1 mmol/L Final     Chloride   Date Value Ref Range Status   02/21/2025 106 95 - 110 mmol/L Final     CO2   Date Value Ref Range Status   02/21/2025 27 23 - 29 mmol/L Final     Glucose   Date Value Ref Range Status   02/21/2025 " 88 70 - 110 mg/dL Final     BUN   Date Value Ref Range Status   02/21/2025 23 8 - 23 mg/dL Final     Creatinine   Date Value Ref Range Status   02/21/2025 0.9 0.5 - 1.4 mg/dL Final     Calcium   Date Value Ref Range Status   02/21/2025 9.3 8.7 - 10.5 mg/dL Final     Albumin   Date Value Ref Range Status   02/21/2025 3.2 (L) 3.5 - 5.2 g/dL Final     Anion Gap   Date Value Ref Range Status   02/21/2025 8 8 - 16 mmol/L Final     Medications Ordered Prior to Encounter[1]         Objective:            Physical Exam  Constitutional:       Appearance: Normal appearance.   HENT:      Head: Normocephalic and atraumatic.   Eyes:      General: No scleral icterus.     Extraocular Movements: Extraocular movements intact.      Pupils: Pupils are equal, round, and reactive to light.   Cardiovascular:      Pulses: Normal pulses.   Pulmonary:      Effort: Pulmonary effort is normal.      Breath sounds: No stridor.   Musculoskeletal:      Right lower leg: No edema.      Left lower leg: No edema.   Skin:     General: Skin is warm and dry.   Neurological:      General: No focal deficit present.      Mental Status: She is alert and oriented to person, place, and time.   Psychiatric:         Mood and Affect: Mood normal.         Behavior: Behavior normal.       Assessment:       1. Stage 3a chronic kidney disease    2. Primary hypertension        Plan:       1..  Creatinine is normal at 0.9.  EGFR is normal for her age.  She is on a RAAS inhibitor.    2. Blood pressure continues to show quite a bit of fluctuation.  As per above she was in the emergency department at [facility] with systolic blood pressure of around 200.  In the clinic today her systolic blood pressure was in the 80s.  She was asymptomatic.  Will continue to monitor.    Given the erratic nature of her blood pressure with extreme spikes sometimes to as high as 200 systolic will check for secondary causes.  Will arrange to have a renal artery Doppler study  performed.  Will also check an aldosterone renin ratio and serum catecholamines.     Approximately 40 minutes was spent in face-to-face conversation and chart review.      Reagan Murray MD         [1]   Current Outpatient Medications on File Prior to Visit   Medication Sig Dispense Refill    amLODIPine (NORVASC) 2.5 MG tablet Take 2.5 mg by mouth 2 (two) times daily.      aspirin (ECOTRIN) 81 MG EC tablet Take 81 mg by mouth.      cloNIDine (CATAPRES) 0.1 MG tablet Take 1 tablet po once for BP > 180/110      ezetimibe (ZETIA) 10 mg tablet Take 10 mg by mouth once daily.      latanoprost 0.005 % ophthalmic solution       meclizine (ANTIVERT) 12.5 mg tablet **NOT COVERED** TAKE 1 TABLET BY MOUTH TWICE A DAY AS NEEDED FOR DIZZINESS  0    nebivoloL (BYSTOLIC) 2.5 MG Tab Take 5 mg by mouth 2 (two) times daily.      SIMBRINZA 1-0.2 % DrpS INSTILL 1 DROP INTO LEFT EYE TWICE A DAY  99    tretinoin (RETIN-A) 0.025 % cream PLEASE SEE ATTACHED FOR DETAILED DIRECTIONS      vitamin D (VITAMIN D3) 1000 units Tab Take 1,000 Units by mouth once daily.      VITAMIN D2 50,000 unit capsule Take 50,000 Units by mouth every 30 days.  0    fenofibrate 120 mg Tab Take 1 tablet by mouth once daily. with food. (Patient not taking: Reported on 2/28/2025)  5    losartan (COZAAR) 50 MG tablet Take 1 tablet (50 mg total) by mouth once daily. (Patient not taking: Reported on 2/28/2025) 90 tablet 0     No current facility-administered medications on file prior to visit.

## 2025-03-06 ENCOUNTER — HOSPITAL ENCOUNTER (OUTPATIENT)
Dept: RADIOLOGY | Facility: HOSPITAL | Age: 80
Discharge: HOME | End: 2025-03-06
Attending: INTERNAL MEDICINE
Payer: MEDICARE

## 2025-03-06 PROCEDURE — 76770 US EXAM ABDO BACK WALL COMP: CPT | Mod: TC

## 2025-04-15 ENCOUNTER — PATIENT MESSAGE (OUTPATIENT)
Dept: NEUROLOGY | Facility: CLINIC | Age: 80
End: 2025-04-15
Payer: MEDICARE

## 2025-04-21 ENCOUNTER — APPOINTMENT (OUTPATIENT)
Dept: LAB | Facility: HOSPITAL | Age: 80
End: 2025-04-21
Attending: INTERNAL MEDICINE
Payer: MEDICARE

## 2025-04-25 ENCOUNTER — TELEPHONE (OUTPATIENT)
Dept: NEPHROLOGY | Facility: CLINIC | Age: 80
End: 2025-04-25
Payer: MEDICARE

## 2025-04-25 DIAGNOSIS — I10 ESSENTIAL HYPERTENSION: Primary | ICD-10-CM

## 2025-04-25 NOTE — TELEPHONE ENCOUNTER
----- Message from Jim sent at 4/25/2025 12:12 PM CDT -----  Contact: self  .Type:  Sooner Apoointment RequestCaller is requesting a sooner appointment.  Caller declined first available appointment listed below.  Caller will not accept being placed on the waitlist and is requesting a message be sent to doctor.Name of Caller:.Heather [facility]matthieu is the first available appointment? June 4Symptoms:Would the patient rather a call back or a response via [facility]? Call [facility] Call Back Number:.[phone] Information: Pt has appt scheduled on Monday at [facility], states she is [facility] but [facility] doesn't have availability with dr goncalves until June 4, she states she needs appt within 10 days of her labwork. Would like to see if she could be squeezed in.

## 2025-04-25 NOTE — TELEPHONE ENCOUNTER
----- Message from Azra sent at 4/25/2025  3:47 PM CDT -----  Type:  Patient Requesting a call back Who Called:Heather What is the call back request regarding?:pt is scheduled for 5/19/25 and would like to be seen or rescheduled back for a sooner Would the patient rather a call back or a response [facility] Call Back Number:[phone] Additional Information:

## 2025-05-12 ENCOUNTER — TELEPHONE (OUTPATIENT)
Dept: NEPHROLOGY | Facility: CLINIC | Age: 80
End: 2025-05-12
Payer: MEDICARE

## 2025-05-12 ENCOUNTER — APPOINTMENT (OUTPATIENT)
Dept: LAB | Facility: HOSPITAL | Age: 80
End: 2025-05-12
Attending: INTERNAL MEDICINE
Payer: MEDICARE

## 2025-05-12 DIAGNOSIS — N18.31 STAGE 3A CHRONIC KIDNEY DISEASE: Primary | ICD-10-CM

## 2025-05-12 NOTE — TELEPHONE ENCOUNTER
Incoming call from patient who states that she ate strawberries before labs and it shows up blood in her urine and she would like to repeat. Tried to explain to patient that he is checking for protein. She insist on repeating. New urine order placed.

## 2025-05-13 ENCOUNTER — LAB VISIT (OUTPATIENT)
Dept: LAB | Facility: HOSPITAL | Age: 80
End: 2025-05-13
Attending: INTERNAL MEDICINE
Payer: MEDICARE

## 2025-05-13 DIAGNOSIS — N18.31 STAGE 3A CHRONIC KIDNEY DISEASE: ICD-10-CM

## 2025-05-13 LAB
BILIRUB UR QL STRIP.AUTO: NEGATIVE
CLARITY UR: CLEAR
COLOR UR AUTO: YELLOW
CREAT UR-MCNC: 83 MG/DL (ref 15–325)
GLUCOSE UR QL STRIP: NEGATIVE
HGB UR QL STRIP: NEGATIVE
KETONES UR QL STRIP: NEGATIVE
LEUKOCYTE ESTERASE UR QL STRIP: NEGATIVE
NITRITE UR QL STRIP: NEGATIVE
PH UR STRIP: 7 [PH]
PROT UR QL STRIP: NEGATIVE
PROT UR-MCNC: <7 MG/DL
PROT/CREAT UR: NORMAL MG/G{CREAT}
SP GR UR STRIP: 1.01

## 2025-05-13 PROCEDURE — 81003 URINALYSIS AUTO W/O SCOPE: CPT

## 2025-05-13 PROCEDURE — 84156 ASSAY OF PROTEIN URINE: CPT

## 2025-05-19 ENCOUNTER — OFFICE VISIT (OUTPATIENT)
Dept: NEPHROLOGY | Facility: CLINIC | Age: 80
End: 2025-05-19
Payer: MEDICARE

## 2025-05-19 VITALS
HEART RATE: 88 BPM | BODY MASS INDEX: 24.5 KG/M2 | SYSTOLIC BLOOD PRESSURE: 128 MMHG | DIASTOLIC BLOOD PRESSURE: 70 MMHG | OXYGEN SATURATION: 100 % | WEIGHT: 138.25 LBS | HEIGHT: 63 IN

## 2025-05-19 DIAGNOSIS — I10 ESSENTIAL HYPERTENSION: ICD-10-CM

## 2025-05-19 DIAGNOSIS — N18.31 STAGE 3A CHRONIC KIDNEY DISEASE: Primary | ICD-10-CM

## 2025-05-19 PROCEDURE — 1101F PT FALLS ASSESS-DOCD LE1/YR: CPT | Mod: CPTII,S$GLB,, | Performed by: INTERNAL MEDICINE

## 2025-05-19 PROCEDURE — 3074F SYST BP LT 130 MM HG: CPT | Mod: CPTII,S$GLB,, | Performed by: INTERNAL MEDICINE

## 2025-05-19 PROCEDURE — 3288F FALL RISK ASSESSMENT DOCD: CPT | Mod: CPTII,S$GLB,, | Performed by: INTERNAL MEDICINE

## 2025-05-19 PROCEDURE — 99999 PR PBB SHADOW E&M-EST. PATIENT-LVL IV: CPT | Mod: PBBFAC,,, | Performed by: INTERNAL MEDICINE

## 2025-05-19 PROCEDURE — 3078F DIAST BP <80 MM HG: CPT | Mod: CPTII,S$GLB,, | Performed by: INTERNAL MEDICINE

## 2025-05-19 PROCEDURE — 1160F RVW MEDS BY RX/DR IN RCRD: CPT | Mod: CPTII,S$GLB,, | Performed by: INTERNAL MEDICINE

## 2025-05-19 PROCEDURE — 99215 OFFICE O/P EST HI 40 MIN: CPT | Mod: S$GLB,,, | Performed by: INTERNAL MEDICINE

## 2025-05-19 PROCEDURE — 1159F MED LIST DOCD IN RCRD: CPT | Mod: CPTII,S$GLB,, | Performed by: INTERNAL MEDICINE

## 2025-05-19 PROCEDURE — 1126F AMNT PAIN NOTED NONE PRSNT: CPT | Mod: CPTII,S$GLB,, | Performed by: INTERNAL MEDICINE

## 2025-05-19 NOTE — PROGRESS NOTES
"Subjective:       Patient ID: Heather Lewis is a 79 y.o. female.    Chief Complaint:  CKD, hypertension    HPI    She presents to clinic today for routine follow-up.  Since her last office visit she has been doing well and has no specific or new complaints.  Her laboratory studies and medications were reviewed.  All Nephrology related questions were answered to her satisfaction.    Review of Systems   Constitutional: Negative.    HENT: Negative.     Respiratory: Negative.     Cardiovascular: Negative.    Gastrointestinal: Negative.    Genitourinary: Negative.    Musculoskeletal: Negative.    Skin: Negative.        /70 (BP Location: Left arm, Patient Position: Sitting)   Pulse 88   Ht 5' 3" (1.6 m)   Wt 62.7 kg (138 lb 3.7 oz)   SpO2 100%   BMI 24.49 kg/m²     Lab Results   Component Value Date    WBC 4.90 09/09/2023    HGB 12.7 09/09/2023    HCT 39.0 09/09/2023    MCV 91 09/09/2023     09/09/2023      BMP  Lab Results   Component Value Date     05/12/2025    K 4.2 05/12/2025     05/12/2025    CO2 27 05/12/2025    BUN 18 05/12/2025    CREATININE 0.9 05/12/2025    CALCIUM 8.7 05/12/2025    ANIONGAP 8 05/12/2025     CMP  Sodium   Date Value Ref Range Status   05/12/2025 141 136 - 145 mmol/L Final   02/21/2025 141 136 - 145 mmol/L Final     Potassium   Date Value Ref Range Status   05/12/2025 4.2 3.5 - 5.1 mmol/L Final   02/21/2025 3.9 3.5 - 5.1 mmol/L Final     Chloride   Date Value Ref Range Status   05/12/2025 106 95 - 110 mmol/L Final   02/21/2025 106 95 - 110 mmol/L Final     CO2   Date Value Ref Range Status   05/12/2025 27 23 - 29 mmol/L Final   02/21/2025 27 23 - 29 mmol/L Final     Glucose   Date Value Ref Range Status   05/12/2025 84 70 - 110 mg/dL Final   02/21/2025 88 70 - 110 mg/dL Final     BUN   Date Value Ref Range Status   05/12/2025 18 8 - 23 mg/dL Final     Creatinine   Date Value Ref Range Status   05/12/2025 0.9 0.5 - 1.4 mg/dL Final     Calcium   Date Value Ref Range " Status   05/12/2025 8.7 8.7 - 10.5 mg/dL Final   02/21/2025 9.3 8.7 - 10.5 mg/dL Final     Albumin   Date Value Ref Range Status   05/12/2025 3.2 (L) 3.5 - 5.2 g/dL Final   02/21/2025 3.2 (L) 3.5 - 5.2 g/dL Final     Anion Gap   Date Value Ref Range Status   05/12/2025 8 8 - 16 mmol/L Final     Medications Ordered Prior to Encounter[1]         Objective:            Physical Exam  Constitutional:       Appearance: Normal appearance.   HENT:      Head: Atraumatic.   Eyes:      General: No scleral icterus.     Extraocular Movements: Extraocular movements intact.      Pupils: Pupils are equal, round, and reactive to light.   Pulmonary:      Effort: Pulmonary effort is normal.      Breath sounds: No stridor.   Musculoskeletal:      Right lower leg: No edema.      Left lower leg: No edema.   Skin:     General: Skin is warm and dry.   Neurological:      General: No focal deficit present.      Mental Status: She is alert and oriented to person, place, and time.   Psychiatric:         Mood and Affect: Mood normal.         Behavior: Behavior normal.       Assessment:       1. Stage 3a chronic kidney disease    2. Essential hypertension        Plan:       1. Creatinine has remained stable ranging between 0.8 and 1.0 for the past 6 months to a year.  Urinalysis is bland without evidence of proteinuria.  There have been multiple changes in her blood pressure regimen since her last office visit.  She is no longer on a RAAS inhibitor.      2. She reports over the past several weeks her blood pressures have been running within normal range.  Systolics have been between about 120 and 130.  She has had multiple changes to her blood pressure regimen.  I updated her medication list.  Currently she is only taking 2.5 of nebivolol twice daily.  She has p.r.n. clonidine she takes a quarter of a 0.1 mg tablet if her systolic is greater than 160.    A minimum of 40 minutes was spent in face-to-face conversation and chart  review.      Reagan Murray MD         [1]   Current Outpatient Medications on File Prior to Visit   Medication Sig Dispense Refill    aspirin (ECOTRIN) 81 MG EC tablet Take 81 mg by mouth.      cloNIDine (CATAPRES) 0.1 MG tablet Take 1 tablet po once for BP > 180/110      ezetimibe (ZETIA) 10 mg tablet Take 10 mg by mouth once daily.      fenofibrate 120 mg Tab Take 1 tablet by mouth once daily. with food.  5    latanoprost 0.005 % ophthalmic solution       meclizine (ANTIVERT) 12.5 mg tablet **NOT COVERED** TAKE 1 TABLET BY MOUTH TWICE A DAY AS NEEDED FOR DIZZINESS  0    nebivoloL (BYSTOLIC) 2.5 MG Tab Take 5 mg by mouth 2 (two) times daily.      SIMBRINZA 1-0.2 % DrpS INSTILL 1 DROP INTO LEFT EYE TWICE A DAY  99    tretinoin (RETIN-A) 0.025 % cream PLEASE SEE ATTACHED FOR DETAILED DIRECTIONS      vitamin D (VITAMIN D3) 1000 units Tab Take 1,000 Units by mouth once daily.      VITAMIN D2 50,000 unit capsule Take 50,000 Units by mouth every 30 days.  0    [DISCONTINUED] amLODIPine (NORVASC) 2.5 MG tablet Take 2.5 mg by mouth 2 (two) times daily.      [DISCONTINUED] losartan (COZAAR) 50 MG tablet Take 1 tablet (50 mg total) by mouth once daily. 90 tablet 0     No current facility-administered medications on file prior to visit.